# Patient Record
Sex: MALE | Race: WHITE | NOT HISPANIC OR LATINO | URBAN - NONMETROPOLITAN AREA
[De-identification: names, ages, dates, MRNs, and addresses within clinical notes are randomized per-mention and may not be internally consistent; named-entity substitution may affect disease eponyms.]

---

## 2018-10-04 NOTE — PATIENT DISCUSSION
POSTERIOR POLAR CATARACT OS:  RECOMMEND AN APPOINTMENT WITH DR. LUBNA Acosta AT [address] FOR FURTHER EVALUATION OF IF SURGERY IS NEEDED OR NOT. HAD LONG DISCUSSION WITH PATIENT. GLASSES RX GIVEN TODAY WITH MINIMAL IMPROVEMENT OF OS.

## 2022-07-27 ENCOUNTER — NEW PATIENT (OUTPATIENT)
Dept: URBAN - NONMETROPOLITAN AREA CLINIC 4 | Facility: CLINIC | Age: 47
End: 2022-07-27

## 2022-07-27 DIAGNOSIS — Z83.518: ICD-10-CM

## 2022-07-27 DIAGNOSIS — H25.13: ICD-10-CM

## 2022-07-27 PROCEDURE — 92004 COMPRE OPH EXAM NEW PT 1/>: CPT

## 2022-07-27 ASSESSMENT — TONOMETRY
OD_IOP_MMHG: 18
OS_IOP_MMHG: 17

## 2022-07-27 ASSESSMENT — VISUAL ACUITY
OD_SC: 20/25-1
OS_SC: 20/25+2